# Patient Record
Sex: FEMALE | Race: WHITE | NOT HISPANIC OR LATINO | Employment: FULL TIME | URBAN - METROPOLITAN AREA
[De-identification: names, ages, dates, MRNs, and addresses within clinical notes are randomized per-mention and may not be internally consistent; named-entity substitution may affect disease eponyms.]

---

## 2017-05-10 ENCOUNTER — OFFICE VISIT (OUTPATIENT)
Dept: OBSTETRICS AND GYNECOLOGY | Facility: CLINIC | Age: 22
End: 2017-05-10

## 2017-05-10 VITALS
HEIGHT: 68 IN | DIASTOLIC BLOOD PRESSURE: 74 MMHG | SYSTOLIC BLOOD PRESSURE: 128 MMHG | WEIGHT: 193.8 LBS | BODY MASS INDEX: 29.37 KG/M2

## 2017-05-10 DIAGNOSIS — Z01.419 WELL WOMAN EXAM WITH ROUTINE GYNECOLOGICAL EXAM: Primary | ICD-10-CM

## 2017-05-10 DIAGNOSIS — L73.2 HIDRADENITIS SUPPURATIVA: ICD-10-CM

## 2017-05-10 DIAGNOSIS — N89.8 VAGINAL DISCHARGE: ICD-10-CM

## 2017-05-10 LAB
BILIRUB BLD-MCNC: ABNORMAL MG/DL
GLUCOSE UR STRIP-MCNC: NEGATIVE MG/DL
KETONES UR QL: NEGATIVE
LEUKOCYTE EST, POC: NEGATIVE
NITRITE UR-MCNC: NEGATIVE MG/ML
PH UR: 6 [PH] (ref 5–8)
PROT UR STRIP-MCNC: NEGATIVE MG/DL
RBC # UR STRIP: ABNORMAL /UL
SP GR UR: 1.03 (ref 1–1.03)
UROBILINOGEN UR QL: NORMAL

## 2017-05-10 PROCEDURE — 81002 URINALYSIS NONAUTO W/O SCOPE: CPT | Performed by: NURSE PRACTITIONER

## 2017-05-10 PROCEDURE — 99395 PREV VISIT EST AGE 18-39: CPT | Performed by: NURSE PRACTITIONER

## 2017-05-17 LAB
A VAGINAE DNA VAG QL NAA+PROBE: ABNORMAL SCORE
BVAB2 DNA VAG QL NAA+PROBE: ABNORMAL SCORE
C ALBICANS DNA VAG QL NAA+PROBE: NEGATIVE
C GLABRATA DNA VAG QL NAA+PROBE: NEGATIVE
C TRACH RRNA SPEC QL NAA+PROBE: NEGATIVE
MEGA1 DNA VAG QL NAA+PROBE: ABNORMAL SCORE
N GONORRHOEA RRNA SPEC QL NAA+PROBE: NEGATIVE
T VAGINALIS RRNA SPEC QL NAA+PROBE: NEGATIVE

## 2017-05-18 ENCOUNTER — TELEPHONE (OUTPATIENT)
Dept: OBSTETRICS AND GYNECOLOGY | Facility: CLINIC | Age: 22
End: 2017-05-18

## 2017-05-18 LAB
CONV .: NORMAL
CYTOLOGIST CVX/VAG CYTO: NORMAL
CYTOLOGY CVX/VAG DOC THIN PREP: NORMAL
DX ICD CODE: NORMAL
HIV 1 & 2 AB SER-IMP: NORMAL
Lab: NORMAL
OTHER STN SPEC: NORMAL
PATH REPORT.FINAL DX SPEC: NORMAL
STAT OF ADQ CVX/VAG CYTO-IMP: NORMAL

## 2017-05-18 RX ORDER — METRONIDAZOLE 500 MG/1
500 TABLET ORAL 2 TIMES DAILY
Qty: 14 TABLET | Refills: 0 | Status: SHIPPED | OUTPATIENT
Start: 2017-05-18 | End: 2017-05-25

## 2018-02-13 ENCOUNTER — OFFICE VISIT (OUTPATIENT)
Dept: OBSTETRICS AND GYNECOLOGY | Facility: CLINIC | Age: 23
End: 2018-02-13

## 2018-02-13 VITALS
BODY MASS INDEX: 33.34 KG/M2 | SYSTOLIC BLOOD PRESSURE: 110 MMHG | DIASTOLIC BLOOD PRESSURE: 78 MMHG | WEIGHT: 220 LBS | HEIGHT: 68 IN

## 2018-02-13 DIAGNOSIS — N89.8 VAGINAL DISCHARGE: Primary | ICD-10-CM

## 2018-02-13 PROCEDURE — 99213 OFFICE O/P EST LOW 20 MIN: CPT | Performed by: NURSE PRACTITIONER

## 2018-02-13 RX ORDER — METRONIDAZOLE 500 MG/1
500 TABLET ORAL 2 TIMES DAILY
Qty: 14 TABLET | Refills: 0 | Status: SHIPPED | OUTPATIENT
Start: 2018-02-13 | End: 2018-02-20

## 2018-02-13 NOTE — PROGRESS NOTES
"[facility]  Routine Annual Visit    2/13/2018    Patient: Arlette Maharaj          MR#:[id number]      History of Present Illness    22 y.o. female No obstetric history on file. who presents with complaint of vaginal discharge. Pap negative, +BV May 2017. Arlette states symptoms feel similar to when she had the BV infection- copious discharge with odor. Using sandra for contraception. No problems with the IUD and no periods. Arlette states she is not currently sexually active but desires STI testing.     No LMP recorded.  Obstetric History:  OB History     No data available         Menstrual History:     No LMP recorded.       Sexual History:       ________________________________________  There is no problem list on file for this patient.      History reviewed. No pertinent past medical history.    No past surgical history on file.    History   Smoking Status   • Current Every Day Smoker   Smokeless Tobacco   • Not on file       History reviewed. No pertinent family history.    Prior to Admission medications    Not on File     ________________________________________      The following portions of the patient's history were reviewed and updated as appropriate: allergies, current medications, past family history, past medical history, past social history, past surgical history and problem list.    Review of Systems   Constitutional: Negative for chills, fatigue and fever.   Gastrointestinal: Negative for abdominal distention and abdominal pain.   Genitourinary: Positive for vaginal discharge. Negative for decreased urine volume, difficulty urinating, dyspareunia, dysuria, frequency, genital sores, hematuria, menstrual problem, pelvic pain, urgency, vaginal bleeding and vaginal pain.         Objective   Physical Exam    /78  Ht 172.7 cm (67.99\")  Wt 99.8 kg (220 lb)  LMP Comment: sandra  BMI 33.46 kg/m2   BP Readings from Last 3 Encounters:   02/13/18 110/78   05/10/17 128/74      Wt Readings from " "Last 3 Encounters:   02/13/18 99.8 kg (220 lb)   05/10/17 87.9 kg (193 lb 12.8 oz)        BMI: Estimated body mass index is 33.46 kg/(m^2) as calculated from the following:    Height as of this encounter: 172.7 cm (67.99\").    Weight as of this encounter: 99.8 kg (220 lb).      External genitalia WNL- negative for lesions, swelling, and skin discolorations  Vagina WNL- clear, no lesions, thin milky discharge coating vaginal walls  Cervix WNL- no lesions, discharge, or CMT  Uterus NSSC,  freely mobile, nontender  Adnexa WNL- no masses or tenderness            Assessment:    1. Vaginal discharge- likely BV; metronidzole sent to pharm while awaiting nuswab. Pt to return in May for annual exam.    Plan:    []  Rx:   []  Mammogram request made  []  PAP done  []  Occult fecal blood test ([facility])  []  Labs:   [x]  GC/Chl/TV  []  DEXA scan   []  Referral for colonoscopy:           Lina Andrews, APRN  2/13/2018 1:39 PM  "

## 2018-02-15 LAB
A VAGINAE DNA VAG QL NAA+PROBE: ABNORMAL SCORE
BVAB2 DNA VAG QL NAA+PROBE: ABNORMAL SCORE
C ALBICANS DNA VAG QL NAA+PROBE: NEGATIVE
C GLABRATA DNA VAG QL NAA+PROBE: NEGATIVE
C TRACH RRNA SPEC QL NAA+PROBE: NEGATIVE
MEGA1 DNA VAG QL NAA+PROBE: ABNORMAL SCORE
N GONORRHOEA RRNA SPEC QL NAA+PROBE: NEGATIVE
T VAGINALIS RRNA SPEC QL NAA+PROBE: POSITIVE

## 2018-02-16 ENCOUNTER — TELEPHONE (OUTPATIENT)
Dept: OBSTETRICS AND GYNECOLOGY | Age: 23
End: 2018-02-16

## 2018-02-16 PROBLEM — A59.01 TRICHOMONIASIS OF VAGINA: Status: ACTIVE | Noted: 2018-02-16

## 2018-02-16 NOTE — TELEPHONE ENCOUNTER
Vaginal culture results given pt aware of bv and trich pt was instructed that partner needs to be treated and to abstain from intercourse  Until partner is treated, pt is to call [phone] for a apt in 4-6-wks for test of cure

## 2018-02-16 NOTE — TELEPHONE ENCOUNTER
----- Message from SHAMIR Sinha sent at 2/16/2018  9:57 AM EST -----  Please inform patient her vaginal culture returned positive for BV and trichomoniasis. The flagyl therapy prescribed at her visit should clear both of these infections. Trich is an STI and she will need to be retested in 4-6 weeks to ensure resolution. Partner needs to be treated before resuming intercourse. Thank you.

## 2019-05-03 ENCOUNTER — OFFICE VISIT (OUTPATIENT)
Dept: OBSTETRICS AND GYNECOLOGY | Facility: CLINIC | Age: 24
End: 2019-05-03

## 2019-05-03 VITALS
BODY MASS INDEX: 32.8 KG/M2 | HEIGHT: 67 IN | SYSTOLIC BLOOD PRESSURE: 124 MMHG | WEIGHT: 209 LBS | DIASTOLIC BLOOD PRESSURE: 76 MMHG | HEART RATE: 84 BPM

## 2019-05-03 DIAGNOSIS — Z72.0 TOBACCO USE: ICD-10-CM

## 2019-05-03 DIAGNOSIS — Z01.419 WELL WOMAN EXAM WITH ROUTINE GYNECOLOGICAL EXAM: Primary | ICD-10-CM

## 2019-05-03 PROCEDURE — 99395 PREV VISIT EST AGE 18-39: CPT | Performed by: OBSTETRICS & GYNECOLOGY

## 2019-05-03 NOTE — PROGRESS NOTES
Chief Complaint   Patient presents with   • Annual Exam     last pap: 5.10.17 NILM, (currently has a Shaila IUD)        Arlette Maharaj is a 23 y.o.  who presents for an annual examination     Pap history:  Last pap:  NIL  Prior abnormal paps: no  STDs  Sexually active: yes  History of STDs: yes, but cannot remember what  Has had HPV vaccine: yes  Contraception:  Shaila IUD, due out this month    Screening for BRCA-   Is patient's family history significant for BRCA risk factors? no    History reviewed. No pertinent past medical history.  History reviewed. No pertinent surgical history.  OB History    Para Term  AB Living   0 0 0 0 0 0   SAB TAB Ectopic Molar Multiple Live Births   0 0 0 0 0 0            Social History     Tobacco Use   • Smoking status: Current Every Day Smoker     Packs/day: 0.50   • Smokeless tobacco: Never Used   Substance Use Topics   • Alcohol use: Yes     Comment: social   • Drug use: Yes     Types: Marijuana     Family History   Problem Relation Age of Onset   • Breast cancer Neg Hx    • Ovarian cancer Neg Hx    • Uterine cancer Neg Hx    • Colon cancer Neg Hx    • Deep vein thrombosis Neg Hx    • Pulmonary embolism Neg Hx      No current outpatient medications on file prior to visit.     No current facility-administered medications on file prior to visit.      No Known Allergies     Review of Systems   Constitutional: Negative for chills, fever and unexpected weight change.   HENT: Negative for congestion, nosebleeds and sore throat.    Eyes: Negative for visual disturbance.   Respiratory: Negative for cough, chest tightness and shortness of breath.    Cardiovascular: Negative for chest pain and palpitations.   Gastrointestinal: Negative for abdominal pain, constipation, diarrhea, nausea and vomiting.   Endocrine: Negative for polyuria.   Genitourinary: Negative for difficulty urinating, dyspareunia, dysuria, frequency, genital sores, hematuria, menstrual problem, pelvic  "pain, urgency, vaginal bleeding, vaginal discharge and vaginal pain.   Musculoskeletal: Negative for arthralgias and joint swelling.   Skin: Negative for color change and rash.   Neurological: Negative for dizziness, light-headedness and headaches.   Hematological: Does not bruise/bleed easily.   Psychiatric/Behavioral: Negative for dysphoric mood. The patient is not nervous/anxious.          OBJECTIVE:   Vitals:    05/03/19 0843   BP: 124/76   Pulse: 84   Weight: 94.8 kg (209 lb)   Height: 170.2 cm (67\")      Physical Exam   Constitutional: She is oriented to person, place, and time. She appears well-developed and well-nourished. No distress.   HENT:   Head: Normocephalic and atraumatic.   Eyes: EOM are normal. No scleral icterus.   Neck: Normal range of motion. Neck supple. No thyromegaly present.   Cardiovascular: Normal rate and regular rhythm. Exam reveals no gallop and no friction rub.   No murmur heard.  Pulmonary/Chest: Effort normal and breath sounds normal. No respiratory distress. She has no wheezes. She has no rales. She exhibits no tenderness. Right breast exhibits no inverted nipple. Left breast exhibits no inverted nipple. Breasts are symmetrical. There is no breast swelling.   Abdominal: Soft. Bowel sounds are normal. She exhibits no distension. There is no tenderness. There is no guarding.   Genitourinary: Vagina normal and uterus normal. There is no rash, tenderness, lesion, injury or Bartholin's cyst on the right labia. There is no rash, tenderness, lesion, injury or Bartholin's cyst on the left labia. Uterus is not mobile.   Cervix is not parous. Cervix does not exhibit motion tenderness, discharge, friability, lesion, polyp, nabothian cyst, eversion, pinkness or cyanosis. Right adnexum displays no mass, no tenderness and no fullness. Right adnexum is present.Left adnexum displays no mass, no tenderness and no fullness. Left adnexum is present.No vaginal discharge found.   Musculoskeletal: She " exhibits no edema or deformity.   Neurological: She is alert and oriented to person, place, and time.   Skin: Skin is warm and dry. She is not diaphoretic.   Psychiatric: She has a normal mood and affect. Her speech is normal and behavior is normal. Judgment and thought content normal. Cognition and memory are normal.       ASSESSMENT/PLAN:     Well woman counseling/screening:    Cervical cancer screening:    No h/o cervical dysplasia in past   HPV vaccination completed   The patient is due for a pap next year.    Screening guidelines discussed with patient  Breast cancer screening:    Clinical breast exam recommended for age 20-39 years every 1-3 years   Mammogram recommended starting age 40    Breast self awareness encouraged  STD Screening   Testing desires swab, declines serum.    Contraception :   Desires replacement of IUD.  Considering Kyleena. Will let us know    Family history    does not demonstrate need for genetics referral   Healthy lifestyle counseling:   quit smoking and return for routine annual checkups    Smoking cessation  I advised patient to quit, and offered support.  I spent 5 minutes counseling the patient on benefits of smoking cessation and risks of continuing the behavior.  Further information placed in AVS    FU for IUD insertion

## 2019-05-03 NOTE — PATIENT INSTRUCTIONS
IF YOU SMOKE OR USE TOBACCO PLEASE READ THE FOLLOWING:    Why is smoking bad for me?  Smoking increases the risk of heart disease, lung disease, vascular disease, stroke, and cancer.     If you smoke, STOP!    If you would like more information on quitting smoking, please visit the "[employer]" website: [email or web address] [email or web address]   This link will provide additional resources including the QUIT line and the Beat the Pack support groups.     For more information:    Quit Now [place]  1-800-QUIT-NOW  https://[facility].quitlogix.org/en-US/    Steps to Quit Smoking  Smoking tobacco can be harmful to your health and can affect almost every organ in your body. Smoking puts you, and those around you, at risk for developing many serious chronic diseases. Quitting smoking is difficult, but it is one of the best things that you can do for your health. It is never too late to quit.  What are the benefits of quitting smoking?  When you quit smoking, you lower your risk of developing serious diseases and conditions, such as:  · Lung cancer or lung disease, such as COPD.  · Heart disease.  · Stroke.  · Heart attack.  · Infertility.  · Osteoporosis and bone fractures.    Additionally, symptoms such as coughing, wheezing, and shortness of breath may get better when you quit. You may also find that you get sick less often because your body is stronger at fighting off colds and infections. If you are pregnant, quitting smoking can help to reduce your chances of having a baby of low birth weight.  How do I get ready to quit?  When you decide to quit smoking, create a plan to make sure that you are successful. Before you quit:  · Pick a date to quit. Set a date within the next two weeks to give you time to prepare.  · Write down the reasons why you are quitting. Keep this list in places where you will see it often, such as on your bathroom mirror or in your car or wallet.  · Identify the  people, places, things, and activities that make you want to smoke (triggers) and avoid them. Make sure to take these actions:  ? Throw away all cigarettes at home, at work, and in your car.  ? Throw away smoking accessories, such as ashtrays and lighters.  ? Clean your car and make sure to empty the ashtray.  ? Clean your home, including curtains and carpets.  · Tell your family, friends, and coworkers that you are quitting. Support from your loved ones can make quitting easier.  · Talk with your health care provider about your options for quitting smoking.  · Find out what treatment options are covered by your health insurance.    What strategies can I use to quit smoking?  Talk with your healthcare provider about different strategies to quit smoking. Some strategies include:  · Quitting smoking altogether instead of gradually lessening how much you smoke over a period of time. Research shows that quitting “cold turkey” is more successful than gradually quitting.  · Attending in-person counseling to help you build problem-solving skills. You are more likely to have success in quitting if you attend several counseling sessions. Even short sessions of 10 minutes can be effective.  · Finding resources and support systems that can help you to quit smoking and remain smoke-free after you quit. These resources are most helpful when you use them often. They can include:  ? Online chats with a counselor.  ? Telephone quitlines.  ? Printed self-help materials.  ? Support groups or group counseling.  ? Text messaging programs.  ? Mobile phone applications.  · Taking medicines to help you quit smoking. (If you are pregnant or breastfeeding, talk with your health care provider first.) Some medicines contain nicotine and some do not. Both types of medicines help with cravings, but the medicines that include nicotine help to relieve withdrawal symptoms. Your health care provider may recommend:  ? Nicotine patches, gum, or  lozenges.  ? Nicotine inhalers or sprays.  ? Non-nicotine medicine that is taken by mouth.    Talk with your health care provider about combining strategies, such as taking medicines while you are also receiving in-person counseling. Using these two strategies together makes you more likely to succeed in quitting than if you used either strategy on its own.  If you are pregnant or breastfeeding, talk with your health care provider about finding counseling or other support strategies to quit smoking. Do not take medicine to help you quit smoking unless told to do so by your health care provider.  What things can I do to make it easier to quit?  Quitting smoking might feel overwhelming at first, but there is a lot that you can do to make it easier. Take these important actions:  · Reach out to your family and friends and ask that they support and encourage you during this time. Call telephone quitlines, reach out to support groups, or work with a counselor for support.  · Ask people who smoke to avoid smoking around you.  · Avoid places that trigger you to smoke, such as bars, parties, or smoke-break areas at work.  · Spend time around people who do not smoke.  · Lessen stress in your life, because stress can be a smoking trigger for some people. To lessen stress, try:  ? Exercising regularly.  ? Deep-breathing exercises.  ? Yoga.  ? Meditating.  ? Performing a body scan. This involves closing your eyes, scanning your body from head to toe, and noticing which parts of your body are particularly tense. Purposefully relax the muscles in those areas.  · Download or purchase mobile phone or tablet apps (applications) that can help you stick to your quit plan by providing reminders, tips, and encouragement. There are many free apps, such as QuitGuide from the CDC (Centers for Disease Control and Prevention). You can find other support for quitting smoking (smoking cessation) through smokefree.gov and other websites.    How  will I feel when I quit smoking?  Within the first 24 hours of quitting smoking, you may start to feel some withdrawal symptoms. These symptoms are usually most noticeable 2-3 days after quitting, but they usually do not last beyond 2-3 weeks. Changes or symptoms that you might experience include:  · Mood swings.  · Restlessness, anxiety, or irritation.  · Difficulty concentrating.  · Dizziness.  · Strong cravings for sugary foods in addition to nicotine.  · Mild weight gain.  · Constipation.  · Nausea.  · Coughing or a sore throat.  · Changes in how your medicines work in your body.  · A depressed mood.  · Difficulty sleeping (insomnia).    After the first 2-3 weeks of quitting, you may start to notice more positive results, such as:  · Improved sense of smell and taste.  · Decreased coughing and sore throat.  · Slower heart rate.  · Lower blood pressure.  · Clearer skin.  · The ability to breathe more easily.  · Fewer sick days.    Quitting smoking is very challenging for most people. Do not get discouraged if you are not successful the first time. Some people need to make many attempts to quit before they achieve long-term success. Do your best to stick to your quit plan, and talk with your health care provider if you have any questions or concerns.  This information is not intended to replace advice given to you by your health care provider. Make sure you discuss any questions you have with your health care provider.  Document Released: 12/12/2002 Document Revised: 07/24/2018 Document Reviewed: 05/03/2016  [employer] Patient Education © 2019 [employer]

## 2019-05-06 LAB
A VAGINAE DNA VAG QL NAA+PROBE: NORMAL SCORE
BVAB2 DNA VAG QL NAA+PROBE: NORMAL SCORE
C ALBICANS DNA VAG QL NAA+PROBE: NEGATIVE
C GLABRATA DNA VAG QL NAA+PROBE: NEGATIVE
C TRACH RRNA SPEC QL NAA+PROBE: NEGATIVE
MEGA1 DNA VAG QL NAA+PROBE: NORMAL SCORE
N GONORRHOEA RRNA SPEC QL NAA+PROBE: NEGATIVE
T VAGINALIS RRNA SPEC QL NAA+PROBE: NEGATIVE

## 2019-05-08 ENCOUNTER — TELEPHONE (OUTPATIENT)
Dept: OBSTETRICS AND GYNECOLOGY | Facility: CLINIC | Age: 24
End: 2019-05-08

## 2019-06-21 ENCOUNTER — TELEPHONE (OUTPATIENT)
Dept: OBSTETRICS AND GYNECOLOGY | Facility: CLINIC | Age: 24
End: 2019-06-21

## 2019-06-21 NOTE — TELEPHONE ENCOUNTER
Patients mother called on her behalf they were wanting to schedule the removal of her sandra IUD it is pass due but they were hoping to get a replacement at the time of removal. A different IUD was discussed at annual in May and she is wanting to know the status of this IUD so she can schedule to have both done on same day. She did not remember the new option from the appointment notes it looks like it was the Kyleena

## 2019-06-26 NOTE — TELEPHONE ENCOUNTER
Called pt no answer, left msg to return call. Pt may scheduled appt for removal and reinsertion pt is covered by her insurance.

## 2019-08-06 ENCOUNTER — PROCEDURE VISIT (OUTPATIENT)
Dept: OBSTETRICS AND GYNECOLOGY | Facility: CLINIC | Age: 24
End: 2019-08-06

## 2019-08-06 VITALS
WEIGHT: 214 LBS | HEART RATE: 78 BPM | DIASTOLIC BLOOD PRESSURE: 83 MMHG | SYSTOLIC BLOOD PRESSURE: 135 MMHG | BODY MASS INDEX: 33.59 KG/M2 | HEIGHT: 67 IN

## 2019-08-06 DIAGNOSIS — Z30.433 ENCOUNTER FOR REMOVAL AND REINSERTION OF INTRAUTERINE CONTRACEPTIVE DEVICE (IUD): Primary | ICD-10-CM

## 2019-08-06 LAB
B-HCG UR QL: NEGATIVE
INTERNAL NEGATIVE CONTROL: NEGATIVE
INTERNAL POSITIVE CONTROL: POSITIVE
Lab: NORMAL

## 2019-08-06 PROCEDURE — 58301 REMOVE INTRAUTERINE DEVICE: CPT | Performed by: OBSTETRICS & GYNECOLOGY

## 2019-08-06 PROCEDURE — 58300 INSERT INTRAUTERINE DEVICE: CPT | Performed by: OBSTETRICS & GYNECOLOGY

## 2019-08-06 PROCEDURE — 81025 URINE PREGNANCY TEST: CPT | Performed by: OBSTETRICS & GYNECOLOGY

## 2019-08-06 NOTE — PROGRESS NOTES
Kyleena IUD Removal/Insertion Procedure Note    Indications:  IUD, Desires removal and reinsertion   Pre Procedural Diagnosis:  IUD, desires replacement    Post Procedural Diagnosis: Same  Counseling:  Patient was counseled on risks of IUD insertion including but not limited to abnormal bleeding, infection, uterine perforation, expulsion, failure of contraception resulting in pregnancy, need for additional procedures and/or need for surgery.  All questions were answered to apparent satisfaction.  She was counseled about the duration of treatment, recommended removal in 5 years.  She was advised to perform monthly string checks and to see evaluation with unexpected changes in bleeding patterns and/or unable to feel the strings.     Procedure Details   Gonorrhea/chlamydia testing was done and result was negative.The risks of insertion and removal (including infection, bleeding, pain, expulsion, failure to prevent pregnancy, increase risk of ectopic pregnancy and uterine perforation) and benefits of the procedure were explained to the patient.  Questions were answered.  Informed consent was obtained.    Bimanual exam revealed Anteverted.  A speculum was inserted. The IUD strings were seen, grasped with a ring forcep and removed without difficulty.  The Kyleena IUD was inspected and noted to be removed in its entirety.  Cervix cleansed with Betadine. Tenaculum was placed on the anterior lip of the cervix.  Uterus sounded to 7 cm. Kyleena IUD inserted without difficulty. String visible and trimmed. Patient tolerated procedure well.    IUD Information:  See medication record.    Condition:  Stable    Complications:  None    Plan:    The patient was advised to call for any fever or for prolonged or severe pain or bleeding; she was also advised to use a back up method of contraception for 7 days or abstain from intercourse. She was advised to use OTC analgesics as needed for mild to moderate pain.  Return to  the clinic in 4 weeks for follow-up.

## 2019-08-08 ENCOUNTER — TELEPHONE (OUTPATIENT)
Dept: OBSTETRICS AND GYNECOLOGY | Facility: CLINIC | Age: 24
End: 2019-08-08

## 2019-08-08 LAB
C TRACH RRNA SPEC QL NAA+PROBE: NEGATIVE
N GONORRHOEA RRNA SPEC QL NAA+PROBE: NEGATIVE
T VAGINALIS RRNA VAG QL NAA+PROBE: NEGATIVE

## 2019-08-08 NOTE — TELEPHONE ENCOUNTER
----- Message from Osiris Hinds MD sent at 8/8/2019 10:11 AM EDT -----  Please inform patient of negative gonorrhea/chlamydia/trichomonas testing    08/08/19  Called patient to inform results, no answer. Left a message to return call.

## 2019-10-15 ENCOUNTER — TELEPHONE (OUTPATIENT)
Dept: OBSTETRICS AND GYNECOLOGY | Facility: CLINIC | Age: 24
End: 2019-10-15

## 2020-06-30 ENCOUNTER — OFFICE VISIT (OUTPATIENT)
Dept: OBSTETRICS AND GYNECOLOGY | Facility: CLINIC | Age: 25
End: 2020-06-30

## 2020-06-30 VITALS
HEIGHT: 67 IN | HEART RATE: 92 BPM | DIASTOLIC BLOOD PRESSURE: 94 MMHG | SYSTOLIC BLOOD PRESSURE: 133 MMHG | BODY MASS INDEX: 34.21 KG/M2 | WEIGHT: 218 LBS

## 2020-06-30 DIAGNOSIS — Z01.419 WELL WOMAN EXAM WITH ROUTINE GYNECOLOGICAL EXAM: ICD-10-CM

## 2020-06-30 DIAGNOSIS — Z11.3 SCREENING EXAMINATION FOR STD (SEXUALLY TRANSMITTED DISEASE): Primary | ICD-10-CM

## 2020-06-30 PROCEDURE — 17110 DESTRUCTION B9 LES UP TO 14: CPT | Performed by: OBSTETRICS & GYNECOLOGY

## 2020-06-30 PROCEDURE — 99395 PREV VISIT EST AGE 18-39: CPT | Performed by: OBSTETRICS & GYNECOLOGY

## 2020-06-30 NOTE — PROGRESS NOTES
SUBJECTIVE:   Chief Complaint   Patient presents with   • Exposure to STD     pt reports vaginal itching bump/sore in the vaginal area. Pt states she thinks she may have been exposed to STD   • Gynecologic Exam        Arlette Maharaj is a 24 y.o.  who presents for an annual exam and a sore in the vaginal area.  She is also concerned about STDs.  Has had 3 new partners in the last 90 days.  She has not been using protection. She would like to have testing for GC/CT/Trichomonas as well as serum labs.  Had bump for 1-2 weeks, thinks it is overall improving. Itches but does not burn.  She reports a vaginal discharge for a few days. Has h/o hidradenitis suppuritiva    Pap history:  Last pap:  NIL  Prior abnormal paps: no  STDs  Sexually active: yes  History of STDs: yes, but cannot remember what  Has had HPV vaccine: yes  Contraception:  Kyleena IUD, placed 2019      History reviewed. No pertinent past medical history.  History reviewed. No pertinent surgical history.  OB History    Para Term  AB Living   0 0 0 0 0 0   SAB TAB Ectopic Molar Multiple Live Births   0 0 0 0 0 0      Social History     Tobacco Use   • Smoking status: Current Every Day Smoker     Packs/day: 0.50   • Smokeless tobacco: Never Used   Substance Use Topics   • Alcohol use: Yes     Comment: social   • Drug use: Yes     Types: Marijuana     Family History   Problem Relation Age of Onset   • Breast cancer Neg Hx    • Ovarian cancer Neg Hx    • Uterine cancer Neg Hx    • Colon cancer Neg Hx    • Deep vein thrombosis Neg Hx    • Pulmonary embolism Neg Hx      Current Outpatient Medications on File Prior to Visit   Medication Sig Dispense Refill   • levonorgestrel (Kyleena) 19.5 MG intrauterine device IUD 1 each by Intrauterine route 1 (One) Time.       No current facility-administered medications on file prior to visit.      No Known Allergies     Review of Systems   Constitutional: Negative.    HENT: Negative.   "  Respiratory: Negative.    Cardiovascular: Negative.    Gastrointestinal: Negative.    Endocrine: Negative.    Genitourinary: Positive for vaginal discharge.   Musculoskeletal: Negative.    Skin: Positive for wound.   Neurological: Negative.    Psychiatric/Behavioral: Negative.          OBJECTIVE:   Vitals:    06/30/20 0846   BP: 133/94   Pulse: 92   Weight: 98.9 kg (218 lb)   Height: 170.2 cm (67.01\")      Physical Exam   Constitutional: She is oriented to person, place, and time. She appears well-developed and well-nourished. No distress.   HENT:   Head: Normocephalic and atraumatic.   Eyes: EOM are normal. No scleral icterus.   Neck: Normal range of motion.   Cardiovascular: Normal rate and regular rhythm.   Pulmonary/Chest: Effort normal. No respiratory distress.   Abdominal: Soft. She exhibits no distension.   Genitourinary: Uterus normal.       There is no rash, tenderness, lesion, injury or Bartholin's cyst on the right labia. There is no rash, tenderness, lesion, injury or Bartholin's cyst on the left labia. Cervix exhibits visible IUD strings. Cervix does not exhibit motion tenderness. Right adnexum displays no mass, no tenderness and no fullness. Right adnexum is present.Left adnexum displays no mass, no tenderness and no fullness. Left adnexum is present.Vagina exhibits no lesion and no loss of rugae. No erythema, tenderness or bleeding in the vagina. No foreign body in the vagina. No signs of injury around the vagina. No vaginal discharge found.   Musculoskeletal: Normal range of motion.   Neurological: She is alert and oriented to person, place, and time.   Skin: Skin is warm and dry. No rash noted. She is not diaphoretic. No erythema.   Psychiatric: She has a normal mood and affect. Her behavior is normal. Judgment and thought content normal.       ASSESSMENT/PLAN:     ICD-10-CM ICD-9-CM   1. Screening examination for STD (sexually transmitted disease) Z11.3 V74.5   2. Well woman exam with routine " gynecological exam Z01.419 V72.31     Arlette Maharaj was counseled on screening for STDs.  She was counseled on the types of STDs, the methods of screening and the clinical implications of the different diseases.  She was counseled on the nature of transmission and safe sex practices.   She desires the following testing: chlamydia, gonococcus, hepatitis C, HIV, trichomonas and syphilis. She agrees to a pap smear today and annual exam (see below).       Reviewed lesion on labia minora.  Plaque-like, c/w condyloma.  Agrees to TCA    Reviewed limitations of HSV IgG/igM testing. She has no HSV appearing lesions and declines HSV testing    Well woman counseling/screening:     Cervical cancer screening:      No h/o cervical dysplasia in past     HPV vaccination completed     The patient is due for a pap today.                Screening guidelines discussed with patient  Breast cancer screening:      Clinical breast exam recommended for age 20-39 years every 1-3 years, declined CBE     Mammogram recommended starting age 40   Contraception :    Kyleena IUD, placed in 2019   Family history      does not demonstrate need for genetics referral   Healthy lifestyle counseling:     quit smoking and return for routine annual checkups      Return if symptoms worsen or fail to improve, for Annual physical.      Procedure   Procedures    Subjective   Arlette Maharaj is a 24 y.o. female who needs treatment of genital warts. Areas are typical in appearance for condyloma amy.     Objective    Skin: 1 wart(s) noted on left labia minora. Size range is <0.5 cm.     Assessment/Plan   Genital warts (condyloma amy)    1. TCA was applied to 1 wart(s).   2. Patient tolerated procedure well.   3. Discussed other options for treatment including possible need for biopsy or excision without improvement

## 2020-07-01 LAB
HCV AB S/CO SERPL IA: 0.2 S/CO RATIO (ref 0–0.9)
HIV 1+2 AB+HIV1 P24 AG SERPL QL IA: NON REACTIVE
RPR SER QL: NORMAL

## 2020-07-02 ENCOUNTER — TELEPHONE (OUTPATIENT)
Dept: OBSTETRICS AND GYNECOLOGY | Facility: CLINIC | Age: 25
End: 2020-07-02

## 2020-07-02 LAB
C TRACH RRNA SPEC QL NAA+PROBE: NEGATIVE
CONV .: NORMAL
CYTOLOGIST CVX/VAG CYTO: NORMAL
CYTOLOGY CVX/VAG DOC CYTO: NORMAL
CYTOLOGY CVX/VAG DOC THIN PREP: NORMAL
DX ICD CODE: NORMAL
HIV 1 & 2 AB SER-IMP: NORMAL
N GONORRHOEA RRNA SPEC QL NAA+PROBE: NEGATIVE
OTHER STN SPEC: NORMAL
STAT OF ADQ CVX/VAG CYTO-IMP: NORMAL
T VAGINALIS DNA SPEC QL NAA+PROBE: NEGATIVE

## 2020-07-02 NOTE — TELEPHONE ENCOUNTER
----- Message from Osiris Hinds MD sent at 7/2/2020 11:51 AM EDT -----  Please call patient and let her know that her STD test results were normal; we are still awaiting her pap smear.  If she has any questions, I am happy to answer them. Thanks!    07/02/20  Called patient to inform results, no answer. Left a message to return call.

## 2021-07-07 ENCOUNTER — OFFICE VISIT (OUTPATIENT)
Dept: OBSTETRICS AND GYNECOLOGY | Facility: CLINIC | Age: 26
End: 2021-07-07

## 2021-07-07 VITALS
BODY MASS INDEX: 33.59 KG/M2 | HEIGHT: 67 IN | WEIGHT: 214 LBS | DIASTOLIC BLOOD PRESSURE: 89 MMHG | SYSTOLIC BLOOD PRESSURE: 130 MMHG

## 2021-07-07 DIAGNOSIS — Z11.3 SCREENING FOR STD (SEXUALLY TRANSMITTED DISEASE): ICD-10-CM

## 2021-07-07 DIAGNOSIS — N89.8 VAGINAL DISCHARGE: Primary | ICD-10-CM

## 2021-07-07 DIAGNOSIS — Z30.432 ENCOUNTER FOR IUD REMOVAL: ICD-10-CM

## 2021-07-07 PROCEDURE — 99214 OFFICE O/P EST MOD 30 MIN: CPT | Performed by: NURSE PRACTITIONER

## 2021-07-07 PROCEDURE — 58301 REMOVE INTRAUTERINE DEVICE: CPT | Performed by: NURSE PRACTITIONER

## 2021-07-07 RX ORDER — METRONIDAZOLE 500 MG/1
500 TABLET ORAL 2 TIMES DAILY
Qty: 14 TABLET | Refills: 0 | Status: SHIPPED | OUTPATIENT
Start: 2021-07-07 | End: 2022-08-17 | Stop reason: SDUPTHER

## 2021-07-07 RX ORDER — SERTRALINE HYDROCHLORIDE 100 MG/1
TABLET, FILM COATED ORAL
COMMUNITY
Start: 2021-05-24

## 2021-07-07 RX ORDER — DEXTROAMPHETAMINE SULFATE, DEXTROAMPHETAMINE SACCHARATE, AMPHETAMINE SULFATE AND AMPHETAMINE ASPARTATE 5; 5; 5; 5 MG/1; MG/1; MG/1; MG/1
CAPSULE, EXTENDED RELEASE ORAL
COMMUNITY
Start: 2021-05-29

## 2021-07-07 NOTE — PROGRESS NOTES
"Chief Complaint   Patient presents with   • Vaginal Discharge     Pt c/o vaginal dischage and odor. Pt would also like to discuss removing IUD.         SUBJECTIVE:     Arlette Maharaj is a 25 y.o.  who presents with c/o vaginal discharge and odor for one week. This is a new problem.  She treated these symptoms with diflucan with no improvement in symptoms. She reports a hx of frequent BV infections. Last documented episode was 2019. Denies vaginal itching. Denies pelvic pain or dysuria.  She is sexually active, not using condoms. Kyleena IUD placed 2019. LMP absent with Kyleena.     She would like STD testing, including serum testing.     She would like IUD removed, she feels this is causing frequent infections and \"skin issues\". She is not interested in other forms of contraception at this time.     Last AE 2020    This is my first time meeting Arlette Maharaj  She is a pt of Dr Hinds's    History reviewed. No pertinent past medical history.   History reviewed. No pertinent surgical history.   Social History     Tobacco Use   • Smoking status: Current Every Day Smoker     Packs/day: 0.50   • Smokeless tobacco: Never Used   Substance Use Topics   • Alcohol use: Yes     Comment: social   • Drug use: Yes     Types: Marijuana     OB History    Para Term  AB Living   0 0 0 0 0 0   SAB TAB Ectopic Molar Multiple Live Births   0 0 0 0 0 0        Review of Systems   Constitutional: Negative for chills, fatigue and fever.   Gastrointestinal: Negative for abdominal distention, abdominal pain, nausea and vomiting.   Genitourinary: Positive for vaginal discharge. Negative for dyspareunia, dysuria, menstrual problem, vaginal bleeding and vaginal pain.        + vaginal odor  - vaginal itching   Musculoskeletal: Negative for gait problem.       OBJECTIVE:   Vitals:    21 0917   BP: 130/89   Weight: 97.1 kg (214 lb)   Height: 170.2 cm (67\")        Physical Exam  Vitals and nursing note reviewed. "   Constitutional:       Appearance: Normal appearance.   HENT:      Head: Normocephalic and atraumatic.   Eyes:      Pupils: Pupils are equal, round, and reactive to light.   Pulmonary:      Effort: Pulmonary effort is normal.   Abdominal:      General: There is no distension.      Palpations: Abdomen is soft. There is no mass.      Tenderness: There is no abdominal tenderness. There is no guarding.      Hernia: No hernia is present. There is no hernia in the left inguinal area or right inguinal area.   Genitourinary:     General: Normal vulva.      Exam position: Lithotomy position.      Pubic Area: No rash or pubic lice.       Labia:         Right: No rash, tenderness, lesion or injury.         Left: No rash, tenderness, lesion or injury.       Urethra: No prolapse, urethral pain, urethral swelling or urethral lesion.      Vagina: Foreign body (IUD strings present) present. No signs of injury. No vaginal discharge, erythema, tenderness, bleeding, lesions or prolapsed vaginal walls.      Cervix: Normal.      Uterus: Normal.       Adnexa: Right adnexa normal and left adnexa normal.      Comments: Multiple healed scars scattered throughout groin and thighs consistent with HS  Musculoskeletal:         General: Normal range of motion.      Cervical back: Normal range of motion.   Lymphadenopathy:      Lower Body: No right inguinal adenopathy. No left inguinal adenopathy.   Skin:     General: Skin is warm and dry.   Neurological:      General: No focal deficit present.      Mental Status: She is alert and oriented to person, place, and time.      Cranial Nerves: No cranial nerve deficit.   Psychiatric:         Mood and Affect: Mood normal.         Behavior: Behavior normal.         Thought Content: Thought content normal.         Judgment: Judgment normal.     IUD removal  Type of IUD:  Kyleena   Date of insertion:  2019  Reason for removal:  pt desires removal  Other relevant history/information:  none    Procedure  Time Out Documentation      Procedure Details  IUD strings visible:  yes  Local anesthesia:  None  Tenaculum used:  None  Removal:  IUD strings grasped and IUD removed intact with gentle traction.  The patient tolerated the procedure well.    All appropriate instructions regarding removal were reviewed.    Tolerated well  No apparent complications  Post procedure diagnosis : IUD removal     Plans for contraception:  condoms    Other follow-up needed:  none    The patient was advised to call for any fever or for prolonged or severe pain or bleeding. She was advised to use NSAID as needed for mild to moderate pain.     ASSESSMENT:   1) Vaginal discharge  2) vaginal odor  3) IUD removal  4) Screening for STD    PLAN:     NuSwab+ collected, HIV, RPR, and hepatitis collected  No abnormal discharge noted on exam, however pt desires to treat empirically with flagyl while awaiting results. Rx sent to pharmacy, informed to avoid alcohol while taking  Discussed documentation of vaginal infections, if 3 or more episodes of BV in one year can treat with long term metro gel. Discussed avoidance of douching, maye steaming, and high fragrant soaps. Recommend cotton only underwear.  Lengthy discussion with pt prior to IUD removal, discussed trying long term treatment for frequent BV infections with metro gel before removal. She feels very strongly about removal today.   She is not interested in other forms of contraception at this time, plans to use condoms. Encouraged daily PNV with folic acid.     Encouraged to schedule AE-states she is scheduled in the fall.    Follow up:PRN and annually       Jasmine Jalloh, APRN  7/7/2021  09:25 EDT

## 2021-07-08 LAB
HAV IGM SERPL QL IA: NEGATIVE
HBV CORE IGM SERPL QL IA: NEGATIVE
HBV SURFACE AG SERPL QL IA: NEGATIVE
HCV AB S/CO SERPL IA: <0.1 S/CO RATIO (ref 0–0.9)
HIV 1+2 AB+HIV1 P24 AG SERPL QL IA: NON REACTIVE
RPR SER QL: NORMAL

## 2021-07-09 ENCOUNTER — TELEPHONE (OUTPATIENT)
Dept: OBSTETRICS AND GYNECOLOGY | Facility: CLINIC | Age: 26
End: 2021-07-09

## 2021-07-09 LAB
A VAGINAE DNA VAG QL NAA+PROBE: ABNORMAL SCORE
BVAB2 DNA VAG QL NAA+PROBE: ABNORMAL SCORE
C ALBICANS DNA VAG QL NAA+PROBE: NEGATIVE
C GLABRATA DNA VAG QL NAA+PROBE: NEGATIVE
C TRACH DNA VAG QL NAA+PROBE: NEGATIVE
MEGA1 DNA VAG QL NAA+PROBE: ABNORMAL SCORE
N GONORRHOEA DNA VAG QL NAA+PROBE: NEGATIVE
T VAGINALIS DNA VAG QL NAA+PROBE: NEGATIVE

## 2021-07-09 NOTE — TELEPHONE ENCOUNTER
----- Message from SHAMIR Song sent at 7/9/2021 10:56 AM EDT -----  Please let the pt know blood work was negative for HIV, syphilis, and hepatitis. Vaginal cultures were positive for BV, she was treated for this at her last visit. Thank you

## 2021-07-09 NOTE — PROGRESS NOTES
Please let the pt know blood work was negative for HIV, syphilis, and hepatitis. Vaginal cultures were positive for BV, she was treated for this at her last visit. Thank you

## 2021-11-02 ENCOUNTER — TELEPHONE (OUTPATIENT)
Dept: OBSTETRICS AND GYNECOLOGY | Facility: CLINIC | Age: 26
End: 2021-11-02

## 2021-11-02 RX ORDER — METRONIDAZOLE 500 MG/1
500 TABLET ORAL 2 TIMES DAILY
Qty: 14 TABLET | Refills: 0 | Status: SHIPPED | OUTPATIENT
Start: 2021-11-02 | End: 2021-11-09

## 2021-11-02 NOTE — TELEPHONE ENCOUNTER
Good afternoon,  Patient is calling to see if you can send her something in for a bacterial infection.  Patient scheduled an appointment to be seen in office, but is wanting some relief until then.      Please advise,  Thank you.      Pharmacy informed- [facility] [address] - [place], [place] - [address] [place] [address] AT [facility] - [phone] [facility] [phone] FX

## 2021-12-22 ENCOUNTER — TELEPHONE (OUTPATIENT)
Dept: OBSTETRICS AND GYNECOLOGY | Facility: CLINIC | Age: 26
End: 2021-12-22

## 2022-08-16 ENCOUNTER — TELEPHONE (OUTPATIENT)
Dept: OBSTETRICS AND GYNECOLOGY | Facility: CLINIC | Age: 27
End: 2022-08-16

## 2022-08-16 NOTE — TELEPHONE ENCOUNTER
Guzman Breaux pt has an appt scheduled with you for a screening 9/9. She is also requesting an rx for bv (she Is fairly certain this is what she has). Would you be able to send that over or should I check with Dr. George?    Pharmacy: [address] - [phone] [facility] [phone] FX    Thank you,  Sandi

## 2022-08-17 RX ORDER — METRONIDAZOLE 500 MG/1
500 TABLET ORAL 2 TIMES DAILY
Qty: 14 TABLET | Refills: 0 | Status: SHIPPED | OUTPATIENT
Start: 2022-08-17 | End: 2022-08-24

## 2022-09-09 NOTE — TELEPHONE ENCOUNTER
----- Message from Osiris Hinds MD sent at 5/7/2019  8:56 AM EDT -----  Please inform patient of negative gonorrhea/chlamydia/trichomonas/BV/yeast testing    05/08/19  Called patient to inform results, no answer. Left a message to return call.     [address] [address] [facility], [address] [place] [address]  Dept: [phone]  Dept Fax: [phone]    History and Physical  Patient:  Caesar Carry  YOB: 1981  Date of Service:  2022    Subjective:   Caesjulia Alfaro (:  1981) is a 37 y.o. female, Established patient, here for evaluation of the following chief complaint(s):    Chief Complaint   Patient presents with    Hypertension     Does check at home readings are in range of 117/70 range    Hyperlipidemia    Hypothyroidism      Right handed, having numbness and tingling to bilateral hand, right seems worse. Hypertension  This is a chronic problem. The current episode started more than 1 year ago. The problem is controlled. Associated symptoms include malaise/fatigue. Pertinent negatives include no anxiety, blurred vision, chest pain, headaches, orthopnea, palpitations, peripheral edema, PND or shortness of breath. Agents associated with hypertension include thyroid hormones. Risk factors for coronary artery disease include obesity, sedentary lifestyle and dyslipidemia. Past treatments include angiotensin blockers and diuretics. The current treatment provides moderate improvement. Compliance problems include exercise and diet. Identifiable causes of hypertension include a thyroid problem. Hyperlipidemia  This is a chronic problem. The current episode started more than 1 year ago. The problem is controlled. Recent lipid tests were reviewed and are variable. Exacerbating diseases include hypothyroidism and obesity. She has no history of diabetes. Factors aggravating her hyperlipidemia include thiazides and fatty foods. Pertinent negatives include no chest pain, focal sensory loss, focal weakness, leg pain, myalgias or shortness of breath. She is currently on no antihyperlipidemic treatment. Compliance problems include adherence to exercise and adherence to diet.   Risk factors for coronary artery disease include obesity, dyslipidemia and hypertension. Thyroid Problem  Presents for follow-up visit. Symptoms include fatigue. Patient reports no anxiety, cold intolerance, dry skin, hair loss, heat intolerance, palpitations, tremors, visual change or weight gain. The symptoms have been stable. Her past medical history is significant for hyperlipidemia. There is no history of diabetes. The 10-year ASCVD risk score (Basim Dozier, et al., 2013) is: 5.6%    Values used to calculate the score:      Age: 37 years      Sex: Female      Is Non- : No      Diabetic: No      Tobacco smoker: Yes      Systolic Blood Pressure: 285 mmHg      Is BP treated: Yes      HDL Cholesterol: 43 mg/dL      Total Cholesterol: 211 mg/dL     BP Readings from Last 3 Encounters:   09/12/22 126/88   06/13/22 120/88   04/11/22 (!) 150/90      Pulse Readings from Last 3 Encounters:   09/12/22 64   06/13/22 64   04/11/22 81      Wt Readings from Last 3 Encounters:   09/12/22 181 lb 12.8 oz (82.5 kg)   06/13/22 176 lb 12.8 oz (80.2 kg)   04/11/22 178 lb 12.8 oz (81.1 kg)        Allergies   Allergen Reactions    Levothyroxine      Flares sinus and dizziness       Current Outpatient Medications   Medication Sig Dispense Refill    thyroid (ARMOUR) 16.25 MG TABS Take 16.25 mg by mouth daily 30 tablet 2    vitamin D (ERGOCALCIFEROL) 1.25 MG (21540 UT) CAPS capsule Take 1 capsule by mouth once a week 12 capsule 0    losartan-hydroCHLOROthiazide (HYZAAR) 50-12.5 MG per tablet Take 1 tablet by mouth daily 30 tablet 5    ibuprofen (ADVIL;MOTRIN) 800 MG tablet Take 800 mg by mouth as needed for Pain      Misc Natural Product Nasal (PONARIS) SOLN 1 spray by Nasal route 2 times daily 1 Bottle     loratadine (CLARITIN) 10 MG tablet Take 1 tablet by mouth daily 30 tablet 5     No current facility-administered medications for this visit.         Past Medical History:   Diagnosis Date    Anemia, iron deficiency 2017    Anxiety Interpretation of Total Score Depression Severity: 1-4 = Minimal depression, 5-9 = Mild depression, 10-14 = Moderate depression, 15-19 = Moderately severe depression, 20-27 = Severe depression     Physical Exam:     Vitals:    09/12/22 0954   BP: 126/88   Pulse: 64   SpO2: 97%   Weight: 181 lb 12.8 oz (82.5 kg)      Body mass index is 35.51 kg/m². Physical Exam  Constitutional:       Appearance: Normal appearance. She is well-developed and well-groomed. She is obese. HENT:      Head: Normocephalic. Eyes:      Conjunctiva/sclera: Conjunctivae normal.   Neck:      Thyroid: No thyromegaly. Vascular: No carotid bruit. Cardiovascular:      Rate and Rhythm: Normal rate and regular rhythm. Heart sounds: Normal heart sounds. Pulmonary:      Effort: Pulmonary effort is normal.      Breath sounds: Normal breath sounds. No wheezing. Abdominal:      General: Bowel sounds are normal.      Palpations: Abdomen is soft. Tenderness: There is no abdominal tenderness. Musculoskeletal:      Right wrist: Deformity (Ganglion cyst) present. No tenderness. Normal range of motion. Left wrist: No tenderness. Normal range of motion. Cervical back: Neck supple. Right lower leg: No edema. Left lower leg: No edema. Comments: Examination of the hands - normal radial pulse, normal capillary refilling and circulation, negative Tinel on bilateral, negative Phalen on bilateral    Lymphadenopathy:      Cervical: No cervical adenopathy. Skin:     Capillary Refill: Capillary refill takes less than 2 seconds. Neurological:      Mental Status: She is alert and oriented to person, place, and time. Gait: Gait normal.   Psychiatric:         Mood and Affect: Mood normal.         Behavior: Behavior is cooperative. Assessment/Plan:   1. Primary hypertension  2. Mixed hyperlipidemia  3.  Hypothyroidism, unspecified type  -     thyroid (ARMOUR) 16.25 MG TABS; Take 16.25 mg by mouth daily, Disp-30 tablet, R-2Normal  -     TSH with Reflex; Future  4. Vitamin D deficiency  -     Vitamin D 25 Hydroxy; Future  -     vitamin D (ERGOCALCIFEROL) 1.25 MG (07347 UT) CAPS capsule; Take 1 capsule by mouth once a week, Disp-12 capsule, R-0Normal  5. Hepatic steatosis  6. Fatigue, unspecified type  -     vitamin D (ERGOCALCIFEROL) 1.25 MG (77596 UT) CAPS capsule; Take 1 capsule by mouth once a week, Disp-12 capsule, R-0Normal  -     Vitamin B12; Future  7. Numbness and tingling in both hands  -     Vitamin B12; Future  8. Ganglion cyst of volar aspect of right wrist      All patient questions answered. Patient voiced understanding. Instructed to continue current medications. Patient agreed with treatment plan. Follow up as directed. I have recommended that this patient have a immunization for influenza, pneumonia but she declines at this time. I have discussed the risks and benefits of this examination with her. The patient verbalizes understanding. Return in about 3 months (around 12/12/2022). Please note that this chart was generated using voice recognition Dragon dictation software. Although every effort was made to ensure the accuracy of this automated transcription, some errors in transcription may have occurred.     Electronically signed by JOCELINE Reese CNP on 9/18/2022

## 2023-05-30 ENCOUNTER — TELEPHONE (OUTPATIENT)
Dept: OBSTETRICS AND GYNECOLOGY | Facility: CLINIC | Age: 28
End: 2023-05-30

## 2023-05-30 RX ORDER — METRONIDAZOLE 500 MG/1
500 TABLET ORAL 2 TIMES DAILY
Qty: 14 TABLET | Refills: 0 | Status: SHIPPED | OUTPATIENT
Start: 2023-05-30 | End: 2023-06-06

## 2023-05-30 NOTE — TELEPHONE ENCOUNTER
Pt calling stating she has a bacterial infection (smell odor for a week). Wants medication to be called in for that. Also wants to schedule her annual, knows annual will be pushed out but doesn't want separate appointments. Please advice.

## 2023-09-01 ENCOUNTER — OFFICE VISIT (OUTPATIENT)
Dept: OBSTETRICS AND GYNECOLOGY | Facility: CLINIC | Age: 28
End: 2023-09-01
Payer: MEDICAID

## 2023-09-01 VITALS
DIASTOLIC BLOOD PRESSURE: 83 MMHG | WEIGHT: 236.6 LBS | SYSTOLIC BLOOD PRESSURE: 122 MMHG | BODY MASS INDEX: 37.13 KG/M2 | HEIGHT: 67 IN

## 2023-09-01 DIAGNOSIS — Z01.419 WOMEN'S ANNUAL ROUTINE GYNECOLOGICAL EXAMINATION: Primary | ICD-10-CM

## 2023-09-01 DIAGNOSIS — Z11.3 SCREENING FOR STD (SEXUALLY TRANSMITTED DISEASE): ICD-10-CM

## 2023-09-01 DIAGNOSIS — L73.2 HIDRADENITIS SUPPURATIVA: ICD-10-CM

## 2023-09-01 RX ORDER — CLINDAMYCIN PHOSPHATE 10 MG/G
1 GEL TOPICAL 2 TIMES DAILY
Qty: 30 G | Refills: 8 | Status: SHIPPED | OUTPATIENT
Start: 2023-09-01

## 2023-09-01 RX ORDER — CHLORHEXIDINE GLUCONATE 213 G/1000ML
SOLUTION TOPICAL DAILY PRN
Qty: 473 ML | Refills: 12 | Status: SHIPPED | OUTPATIENT
Start: 2023-09-01

## 2023-09-01 NOTE — PROGRESS NOTES
GYN Annual Exam     Chief Complaint   Patient presents with    Gynecologic Exam     Patient is here today for her annual exam. Ailin AE was . Last pap smear was  NIL.       YOLIS Maharaj is a 27 y.o. female who presents for annual well woman exam.  She is sexually active. Periods are regular every 28-30 days, lasting 6 days. Dysmenorrhea:none. Cyclic symptoms include none. No intermenstrual bleeding, spotting, or discharge. Performing SBE:completes. C/o frequent BV infections, she feels this is worsened after IC. C/o painful acne under arms, breasts and in groin. In the past she has treated with clindagel and would like a refill.     OB History          0    Para   0    Term   0       0    AB   0    Living   0         SAB   0    IAB   0    Ectopic   0    Molar   0    Multiple   0    Live Births   0                LMP- 23 estimated date, she is expecting menses today or tomorrow  Current contraception: none  Last Pap-  NIL  History of abnormal Pap smear: no  History of STD-yes, can't remember what  Family history of uterine, colon or ovarian cancer: no  Family history of breast cancer: no  Gardasil Vaccine: completed    History reviewed. No pertinent past medical history.    History reviewed. No pertinent surgical history.      Current Outpatient Medications:     Adderall XR 20 MG 24 hr capsule, , Disp: , Rfl:     chlorhexidine (Hibiclens) 4 % external liquid, Apply  topically to the appropriate area as directed Daily As Needed for Wound Care., Disp: 473 mL, Rfl: 12    clindamycin (Clindagel) 1 % gel, Apply 1 application  topically to the appropriate area as directed 2 (Two) Times a Day., Disp: 30 g, Rfl: 8    sertraline (ZOLOFT) 100 MG tablet, , Disp: , Rfl:     No Known Allergies    Social History     Tobacco Use    Smoking status: Every Day     Packs/day: 0.50     Types: Cigarettes    Smokeless tobacco: Never   Vaping Use    Vaping Use: Never used   Substance Use Topics     "Alcohol use: Yes     Comment: social    Drug use: Yes     Types: Marijuana       Family History   Problem Relation Age of Onset    Breast cancer Neg Hx     Ovarian cancer Neg Hx     Uterine cancer Neg Hx     Colon cancer Neg Hx     Deep vein thrombosis Neg Hx     Pulmonary embolism Neg Hx        Review of Systems   Constitutional:  Negative for chills, fatigue and fever.   Gastrointestinal:  Negative for abdominal distention, abdominal pain, nausea and vomiting.   Endocrine: Negative for cold intolerance and heat intolerance.   Genitourinary:  Negative for breast discharge, breast lump, breast pain, dysuria, menstrual problem, pelvic pain, pelvic pressure, vaginal bleeding, vaginal discharge and vaginal pain.   Musculoskeletal:  Negative for gait problem.   Skin:  Negative for rash.   Neurological:  Negative for dizziness and headache.   Psychiatric/Behavioral:  Negative for behavioral problems.      /83   Ht 170.2 cm (67.01\")   Wt 107 kg (236 lb 9.6 oz)   LMP 08/01/2023   BMI 37.05 kg/mý     Physical Exam  Constitutional:       General: She is not in acute distress.     Appearance: Normal appearance. She is obese. She is not ill-appearing, toxic-appearing or diaphoretic.   Genitourinary:      Vulva, bladder and urethral meatus normal.      No lesions in the vagina.      Right Labia: No rash, tenderness, lesions, skin changes or Bartholin's cyst.     Left Labia: No tenderness, lesions, skin changes, Bartholin's cyst or rash.     No labial fusion noted.      No inguinal adenopathy present in the right or left side.     No vaginal discharge, erythema, tenderness, bleeding or ulceration.      No vaginal prolapse present.     No vaginal atrophy present.       Right Adnexa: not tender, not full, not palpable, no mass present and not absent.     Left Adnexa: not tender, not full, not palpable, no mass present and not absent.     No cervical motion tenderness, discharge, friability, lesion, polyp, nabothian cyst " or eversion.      Uterus is not enlarged, fixed, tender, irregular or prolapsed.      No uterine mass detected.     No urethral tenderness or mass present.      Pelvic exam was performed with patient in the lithotomy position.   Breasts:     Breasts are symmetrical.      Right: Present. No swelling, bleeding, inverted nipple, mass, nipple discharge, skin change, tenderness or breast implant.      Left: Present. No swelling, bleeding, inverted nipple, mass, nipple discharge, skin change, tenderness or breast implant.   HENT:      Head: Normocephalic and atraumatic.   Eyes:      Pupils: Pupils are equal, round, and reactive to light.   Cardiovascular:      Rate and Rhythm: Normal rate.   Pulmonary:      Effort: Pulmonary effort is normal.   Abdominal:      General: There is no distension.      Palpations: Abdomen is soft. There is no mass.      Tenderness: There is no abdominal tenderness. There is no guarding.      Hernia: No hernia is present. There is no hernia in the left inguinal area or right inguinal area.   Musculoskeletal:         General: Normal range of motion.      Cervical back: Normal range of motion and neck supple. No tenderness.   Lymphadenopathy:      Cervical: No cervical adenopathy.      Upper Body:      Right upper body: No supraclavicular, axillary or pectoral adenopathy.      Left upper body: No supraclavicular, axillary or pectoral adenopathy.      Lower Body: No right inguinal adenopathy. No left inguinal adenopathy.   Neurological:      General: No focal deficit present.      Mental Status: She is alert and oriented to person, place, and time.      Cranial Nerves: No cranial nerve deficit.   Skin:     General: Skin is warm and dry.      Comments: Mild scarring bilateral axilla consistent with HS  Very few pustules under breasts  Scarring in groin is worse than axilla.   2 healing pustules noted on mons    Psychiatric:         Mood and Affect: Mood normal.         Behavior: Behavior normal.          Thought Content: Thought content normal.         Judgment: Judgment normal.   Vitals and nursing note reviewed.       Assessment   Diagnoses and all orders for this visit:    1. Women's annual routine gynecological examination (Primary)  -     IGP,CtNgTv,rfx Apt HPV All    2. Screening for STD (sexually transmitted disease)  -     HIV-1 / O / 2 Ag / Antibody  -     RPR, Rfx Qn RPR / Confirm TP  -     Hepatitis B Surface Antigen  -     Hepatitis C Antibody    3. Hidradenitis suppurativa  -     chlorhexidine (Hibiclens) 4 % external liquid; Apply  topically to the appropriate area as directed Daily As Needed for Wound Care.  Dispense: 473 mL; Refill: 12  -     clindamycin (Clindagel) 1 % gel; Apply 1 application  topically to the appropriate area as directed 2 (Two) Times a Day.  Dispense: 30 g; Refill: 8         Plan   Well woman exam: Pap collected Yes. Recommend MVI daily.    Contraception: Declines  STD: Enc condoms. Desires STD screen today- Yes. Serum and Pap   Smoking status: current every day smoker   Encouraged annual mammogram screening starting at age 40. Instructed on how to perform SBE. Encouraged breast health self awareness.  6.    Encouraged 150 minutes of exercise per week if not medially contraindicated.   7.    Obese by BMI 37.05  8.    C/o frequent BV infections: no current symptoms. Encouraged cotton only underwear, mild or no soaps, avoid douching or mey steaming. Discussed probiotics. Discussed treatment with metro gel for 4 months with proper documentation of positive cultures.   9.   HS: mild-moderate symptoms. Encouraged to avoid shaving. Will refill clindagel and add hibiclens. Discussed doxycycline, however she is not on contraceptives will avoid at this time.     Follow Up one year or PRN    Jasmine Jalloh, APRN  9/1/2023  15:08 EDT

## 2023-09-02 LAB
HBV SURFACE AG SERPL QL IA: NEGATIVE
HCV IGG SERPL QL IA: NON REACTIVE
HIV 1+2 AB+HIV1 P24 AG SERPL QL IA: NON REACTIVE
RPR SER QL: NON REACTIVE

## 2023-09-07 ENCOUNTER — TELEPHONE (OUTPATIENT)
Dept: OBSTETRICS AND GYNECOLOGY | Facility: CLINIC | Age: 28
End: 2023-09-07
Payer: MEDICAID

## 2023-09-07 LAB
C TRACH RRNA CVX QL NAA+PROBE: NEGATIVE
CONV .: NORMAL
CYTOLOGIST CVX/VAG CYTO: NORMAL
CYTOLOGY CVX/VAG DOC CYTO: NORMAL
CYTOLOGY CVX/VAG DOC THIN PREP: NORMAL
DX ICD CODE: NORMAL
HIV 1 & 2 AB SER-IMP: NORMAL
N GONORRHOEA RRNA CVX QL NAA+PROBE: NEGATIVE
OTHER STN SPEC: NORMAL
STAT OF ADQ CVX/VAG CYTO-IMP: NORMAL
T VAGINALIS RRNA SPEC QL NAA+PROBE: NEGATIVE

## 2023-09-07 NOTE — PROGRESS NOTES
Please let the pt know that her pap smear returned normal. STD screening was negative for chlamydia, gonorrhea, trichomonas, HIV, syphilis, and hepatitis B&C. Thank you

## 2024-02-16 ENCOUNTER — OFFICE VISIT (OUTPATIENT)
Dept: OBSTETRICS AND GYNECOLOGY | Facility: CLINIC | Age: 29
End: 2024-02-16
Payer: MEDICAID

## 2024-02-16 VITALS
DIASTOLIC BLOOD PRESSURE: 84 MMHG | WEIGHT: 241 LBS | HEIGHT: 67 IN | BODY MASS INDEX: 37.83 KG/M2 | SYSTOLIC BLOOD PRESSURE: 127 MMHG

## 2024-02-16 DIAGNOSIS — N89.8 VAGINAL LESION: ICD-10-CM

## 2024-02-16 DIAGNOSIS — Z11.3 SCREENING FOR STD (SEXUALLY TRANSMITTED DISEASE): Primary | ICD-10-CM

## 2024-02-20 ENCOUNTER — TELEPHONE (OUTPATIENT)
Dept: OBSTETRICS AND GYNECOLOGY | Facility: CLINIC | Age: 29
End: 2024-02-20
Payer: MEDICAID

## 2024-02-20 LAB
C TRACH RRNA SPEC QL NAA+PROBE: NEGATIVE
N GONORRHOEA RRNA SPEC QL NAA+PROBE: NEGATIVE
T VAGINALIS RRNA SPEC QL NAA+PROBE: NEGATIVE

## 2024-02-20 NOTE — TELEPHONE ENCOUNTER
----- Message from SHAMIR Song sent at 2/20/2024 10:00 AM EST -----  Please let the pt know her vaginal cultures were normal, blood work was negative for HIV, syphilis, and hepatitis B & C. Thank you

## 2024-08-26 ENCOUNTER — OFFICE VISIT (OUTPATIENT)
Dept: OBSTETRICS AND GYNECOLOGY | Facility: CLINIC | Age: 29
End: 2024-08-26
Payer: MEDICAID

## 2024-08-26 VITALS
BODY MASS INDEX: 38.92 KG/M2 | HEIGHT: 67 IN | SYSTOLIC BLOOD PRESSURE: 117 MMHG | WEIGHT: 248 LBS | DIASTOLIC BLOOD PRESSURE: 86 MMHG

## 2024-08-26 DIAGNOSIS — Z32.00 ENCOUNTER FOR PREGNANCY TEST, RESULT UNKNOWN: ICD-10-CM

## 2024-08-26 DIAGNOSIS — Z11.3 SCREENING FOR STD (SEXUALLY TRANSMITTED DISEASE): Primary | ICD-10-CM

## 2024-08-26 DIAGNOSIS — N89.8 VAGINAL DISCHARGE: ICD-10-CM

## 2024-08-26 DIAGNOSIS — R39.9 UTI SYMPTOMS: ICD-10-CM

## 2024-08-26 LAB
B-HCG UR QL: NEGATIVE
BILIRUB BLD-MCNC: NEGATIVE MG/DL
EXPIRATION DATE: NORMAL
GLUCOSE UR STRIP-MCNC: NEGATIVE MG/DL
INTERNAL NEGATIVE CONTROL: NEGATIVE
INTERNAL POSITIVE CONTROL: POSITIVE
KETONES UR QL: NEGATIVE
LEUKOCYTE EST, POC: NEGATIVE
Lab: NORMAL
NITRITE UR-MCNC: NEGATIVE MG/ML
PH UR: 5.5 [PH] (ref 5–8)
PROT UR STRIP-MCNC: NEGATIVE MG/DL
RBC # UR STRIP: NEGATIVE /UL
SP GR UR: 1.03 (ref 1–1.03)
UROBILINOGEN UR QL: NORMAL

## 2024-08-26 PROCEDURE — 99213 OFFICE O/P EST LOW 20 MIN: CPT | Performed by: NURSE PRACTITIONER

## 2024-08-26 PROCEDURE — 81025 URINE PREGNANCY TEST: CPT | Performed by: NURSE PRACTITIONER

## 2024-08-26 PROCEDURE — 1159F MED LIST DOCD IN RCRD: CPT | Performed by: NURSE PRACTITIONER

## 2024-08-26 PROCEDURE — 1160F RVW MEDS BY RX/DR IN RCRD: CPT | Performed by: NURSE PRACTITIONER

## 2024-08-26 RX ORDER — METRONIDAZOLE 500 MG/1
500 TABLET ORAL 2 TIMES DAILY
Qty: 14 TABLET | Refills: 0 | Status: SHIPPED | OUTPATIENT
Start: 2024-08-26 | End: 2024-09-02

## 2024-08-26 NOTE — PROGRESS NOTES
"Chief Complaint   Patient presents with    Follow-up     Pt here today for STD testing, would like pregnancy test done and check for possible UTI        SUBJECTIVE:     Arlette Maharaj is a 28 y.o.  who presents with c/o vaginal discharge, itching, and irritation for several weeks. Reports fish like vaginal odor. Denies a change in soaps, hygiene products. She is not using douches. Reports a recent unprotected sex with a new partner. Denies pelvic pain. C/o dysuria and cloudy urine. Denies fevers, chills, body aches, or N&V. This is a new problem. LMP 24. She has been treating her symptoms with OTC AZO with some relief of symptoms .     History reviewed. No pertinent past medical history.   History reviewed. No pertinent surgical history.     Review of Systems   Constitutional:  Negative for chills, fatigue and fever.   Gastrointestinal:  Negative for abdominal distention and abdominal pain.   Genitourinary:  Positive for dysuria, vaginal discharge and vaginal pain (irritation). Negative for dyspareunia, frequency, genital sores, pelvic pain and vaginal bleeding.        + vaginal itching and odor       OBJECTIVE:   Vitals:    24 1431   BP: 117/86   Weight: 112 kg (248 lb)   Height: 170.2 cm (67.01\")        Physical Exam  Constitutional:       General: She is not in acute distress.     Appearance: Normal appearance. She is not ill-appearing, toxic-appearing or diaphoretic.   Genitourinary:      Bladder and urethral meatus normal.      No lesions in the vagina.      Right Labia: No rash, tenderness, lesions, skin changes or Bartholin's cyst.     Left Labia: No tenderness, lesions, skin changes, Bartholin's cyst or rash.     No labial fusion noted.      No inguinal adenopathy present in the right or left side.     Vaginal discharge (thick, yellow, moderate amount), erythema and tenderness present.      No vaginal bleeding, ulceration or granulation tissue.      No vaginal prolapse present.     No vaginal " atrophy present.       Right Adnexa: not tender, not full, not palpable, no mass present and not absent.     Left Adnexa: not tender, not full, not palpable, no mass present and not absent.     No cervical motion tenderness, discharge, friability, lesion, polyp, nabothian cyst or eversion.      Uterus is not enlarged, fixed, tender, irregular or prolapsed.      No uterine mass detected.  Abdominal:      General: There is no distension.      Palpations: Abdomen is soft. There is no mass.      Tenderness: There is no abdominal tenderness. There is no guarding.      Hernia: No hernia is present. There is no hernia in the left inguinal area or right inguinal area.   Lymphadenopathy:      Lower Body: No right inguinal adenopathy. No left inguinal adenopathy.   Neurological:      Mental Status: She is alert.   Vitals and nursing note reviewed.       Assessment/Plan    Diagnoses and all orders for this visit:    1. Screening for STD (sexually transmitted disease) (Primary)  -     Hepatitis B Surface Antigen  -     Hepatitis C Antibody  -     HIV-1 / O / 2 Ag / Antibody  -     RPR, Rfx Qn RPR / Confirm TP  -     NuSwab VG+ - Swab, Vagina    2. Vaginal discharge  -     metroNIDAZOLE (Flagyl) 500 MG tablet; Take 1 tablet by mouth 2 (Two) Times a Day for 7 days.  Dispense: 14 tablet; Refill: 0  -     NuSwab VG+ - Swab, Vagina      Moderate amount of thick yellow discharge noted, mild erythema present. No lesions noted  Vaginal cultures obtained  She would like to cover for BV while awaiting results, advised no alcohol while taking  Encouraged condoms with IC, cotton only underwear, mild or no soaps, avoidance of douching or mey steaming  Serum STD testing obtained  Urine dip negative for UTI today in office  Pt requested urine pregnancy test negative today in office    Return if symptoms worsen or fail to improve.    I spent 20 minutes caring for Arlette on this date of service. This time includes time spent by me in the  following activities: preparing for the visit, reviewing tests, performing a medically appropriate examination and/or evaluation, counseling and educating the patient/family/caregiver, referring and communicating with other health care professionals, documenting information in the medical record, independently interpreting results and communicating that information with the patient/family/caregiver, care coordination, ordering medications, ordering test(s), obtaining a separately obtained history, and reviewing a separately obtained history    SHAMIR Song  8/26/2024  14:51 EDT

## 2024-08-27 LAB
A VAGINAE DNA VAG QL NAA+PROBE: NORMAL SCORE
BVAB2 DNA VAG QL NAA+PROBE: NORMAL SCORE
C ALBICANS DNA VAG QL NAA+PROBE: NEGATIVE
C GLABRATA DNA VAG QL NAA+PROBE: NEGATIVE
C TRACH DNA SPEC QL NAA+PROBE: NEGATIVE
HBV SURFACE AG SERPL QL IA: NEGATIVE
HCV IGG SERPL QL IA: NON REACTIVE
HIV 1+2 AB+HIV1 P24 AG SERPL QL IA: NON REACTIVE
MEGA1 DNA VAG QL NAA+PROBE: NORMAL SCORE
N GONORRHOEA DNA VAG QL NAA+PROBE: NEGATIVE
RPR SER QL: NON REACTIVE
T VAGINALIS DNA VAG QL NAA+PROBE: NEGATIVE

## 2024-08-28 ENCOUNTER — TELEPHONE (OUTPATIENT)
Dept: OBSTETRICS AND GYNECOLOGY | Facility: CLINIC | Age: 29
End: 2024-08-28
Payer: MEDICAID

## 2024-08-28 NOTE — TELEPHONE ENCOUNTER
----- Message from Jasmine Shubham sent at 8/28/2024  8:15 AM EDT -----  Please let the pt know that her vaginal cultures were negative/normal. Her blood work was negative for HIV, syphilis, and hepatitis B & C. Thank you

## 2024-08-28 NOTE — PROGRESS NOTES
Please let the pt know that her vaginal cultures were negative/normal. Her blood work was negative for HIV, syphilis, and hepatitis B & C. Thank you

## 2025-02-17 ENCOUNTER — TELEPHONE (OUTPATIENT)
Dept: OBSTETRICS AND GYNECOLOGY | Facility: CLINIC | Age: 30
End: 2025-02-17
Payer: MEDICAID

## 2025-02-17 RX ORDER — METRONIDAZOLE 500 MG/1
500 TABLET ORAL 2 TIMES DAILY
Qty: 14 TABLET | Refills: 0 | Status: SHIPPED | OUTPATIENT
Start: 2025-02-17 | End: 2025-02-24

## 2025-02-17 NOTE — TELEPHONE ENCOUNTER
Med request for flagyl for BV symptoms x 3 days. AE scheduled 3/5/25. Last seen 8/26/24 STD screen. 9/1/23 ROBERTO Jones. Thank you

## 2025-02-17 NOTE — TELEPHONE ENCOUNTER
Caller: Arlette Maharaj  Female, 29 y.o., 1995  MRN: [id number]  CSN: [id number]  Phone: [phone]    Relationship: SELF        Requested Prescriptions: PT REQ RX BE SENT TO PHARMACY FOR BACTERIAL INF  Requested Prescriptions      No prescriptions requested or ordered in this encounter        Pharmacy where request should be sent:   [facility] [id number] - [place], [place] - [address] [place] [address] - [phone]  - [phone] [place] [address]   Phone: [phone] Fax: [phone]   Hours: Not open 24 hours        Last office visit with prescribing clinician: 8/26/2024   Last telemedicine visit with prescribing clinician: Visit date not found   Next office visit with prescribing clinician: Visit date not found     Additional details provided by patient: PT REPORTS DISCHARGE AND VAG IRRITATION FOR HÉCTOR 3 DAYS, PT REQ A CALL BACK IF ABLE TO SEND RX      Jake Scruggs Rep   02/17/25 10:33 EST

## 2025-02-17 NOTE — TELEPHONE ENCOUNTER
Spoke with Arlette to let her know that Flagyl sent. She should avoid alcohol with use. I recommend f/u in office if there is no improvement with use.Thank you

## 2025-02-17 NOTE — TELEPHONE ENCOUNTER
Flagyl sent. She should avoid alcohol with use. I recommend f/u in office if there is no improvement with use. -Jasmine

## 2025-03-31 ENCOUNTER — TELEPHONE (OUTPATIENT)
Dept: OBSTETRICS AND GYNECOLOGY | Facility: CLINIC | Age: 30
End: 2025-03-31
Payer: MEDICAID

## 2025-03-31 NOTE — TELEPHONE ENCOUNTER
Jasmine,    Patient slept with someone who has trichomonas. She is wanting the medication sent to pharmacy with no visit. Is that something we would do or does she need a positive swab first? She was scheduled for a swab visit on 3/5/25 and canceled it.     Please advise  Thanks Molly

## 2025-04-01 NOTE — TELEPHONE ENCOUNTER
Pt was provided with information to [facility] for [facility], due to having no insurance or means to pay for a visit.     [facility] [phone]